# Patient Record
Sex: MALE | Race: BLACK OR AFRICAN AMERICAN | NOT HISPANIC OR LATINO | Employment: FULL TIME | URBAN - METROPOLITAN AREA
[De-identification: names, ages, dates, MRNs, and addresses within clinical notes are randomized per-mention and may not be internally consistent; named-entity substitution may affect disease eponyms.]

---

## 2019-01-01 ENCOUNTER — HOSPITAL ENCOUNTER (EMERGENCY)
Facility: HOSPITAL | Age: 44
Discharge: ANOTHER HOSPITAL | End: 2019-01-01
Attending: EMERGENCY MEDICINE
Payer: MEDICAID

## 2019-01-01 VITALS
TEMPERATURE: 100 F | HEART RATE: 52 BPM | HEIGHT: 71 IN | DIASTOLIC BLOOD PRESSURE: 79 MMHG | OXYGEN SATURATION: 100 % | WEIGHT: 140 LBS | SYSTOLIC BLOOD PRESSURE: 133 MMHG | BODY MASS INDEX: 19.6 KG/M2 | RESPIRATION RATE: 16 BRPM

## 2019-01-01 DIAGNOSIS — R22.0 LEFT FACIAL SWELLING: ICD-10-CM

## 2019-01-01 DIAGNOSIS — K04.7 PERIAPICAL ABSCESS: Primary | ICD-10-CM

## 2019-01-01 LAB
ALBUMIN SERPL BCP-MCNC: 4 G/DL
ALP SERPL-CCNC: 85 U/L
ALT SERPL W/O P-5'-P-CCNC: 6 U/L
ANION GAP SERPL CALC-SCNC: 12 MMOL/L
AST SERPL-CCNC: 15 U/L
BASOPHILS # BLD AUTO: 0.01 K/UL
BASOPHILS NFR BLD: 0.2 %
BILIRUB SERPL-MCNC: 0.9 MG/DL
BNP SERPL-MCNC: 35 PG/ML
BUN SERPL-MCNC: 14 MG/DL
CALCIUM SERPL-MCNC: 9.3 MG/DL
CHLORIDE SERPL-SCNC: 100 MMOL/L
CO2 SERPL-SCNC: 24 MMOL/L
CREAT SERPL-MCNC: 1.2 MG/DL
DIFFERENTIAL METHOD: ABNORMAL
EOSINOPHIL # BLD AUTO: 0 K/UL
EOSINOPHIL NFR BLD: 0.2 %
ERYTHROCYTE [DISTWIDTH] IN BLOOD BY AUTOMATED COUNT: 11.9 %
EST. GFR  (AFRICAN AMERICAN): >60 ML/MIN/1.73 M^2
EST. GFR  (NON AFRICAN AMERICAN): >60 ML/MIN/1.73 M^2
GLUCOSE SERPL-MCNC: 105 MG/DL
HCT VFR BLD AUTO: 42.4 %
HGB BLD-MCNC: 14.2 G/DL
IMM GRANULOCYTES # BLD AUTO: 0.01 K/UL
IMM GRANULOCYTES NFR BLD AUTO: 0.2 %
LACTATE SERPL-SCNC: 1 MMOL/L
LYMPHOCYTES # BLD AUTO: 0.9 K/UL
LYMPHOCYTES NFR BLD: 13.8 %
MCH RBC QN AUTO: 31.7 PG
MCHC RBC AUTO-ENTMCNC: 33.5 G/DL
MCV RBC AUTO: 95 FL
MONOCYTES # BLD AUTO: 0.9 K/UL
MONOCYTES NFR BLD: 14.8 %
NEUTROPHILS # BLD AUTO: 4.5 K/UL
NEUTROPHILS NFR BLD: 70.8 %
NRBC BLD-RTO: 0 /100 WBC
PLATELET # BLD AUTO: 255 K/UL
PMV BLD AUTO: 9.8 FL
POTASSIUM SERPL-SCNC: 4 MMOL/L
PROT SERPL-MCNC: 8.4 G/DL
RBC # BLD AUTO: 4.48 M/UL
SODIUM SERPL-SCNC: 136 MMOL/L
WBC # BLD AUTO: 6.29 K/UL

## 2019-01-01 PROCEDURE — 99285 EMERGENCY DEPT VISIT HI MDM: CPT

## 2019-01-01 PROCEDURE — S0077 INJECTION, CLINDAMYCIN PHOSP: HCPCS | Performed by: PHYSICIAN ASSISTANT

## 2019-01-01 PROCEDURE — 25500020 PHARM REV CODE 255: Performed by: EMERGENCY MEDICINE

## 2019-01-01 PROCEDURE — 85025 COMPLETE CBC W/AUTO DIFF WBC: CPT

## 2019-01-01 PROCEDURE — 25000003 PHARM REV CODE 250: Performed by: PHYSICIAN ASSISTANT

## 2019-01-01 PROCEDURE — 99284 EMERGENCY DEPT VISIT MOD MDM: CPT | Mod: ,,, | Performed by: PHYSICIAN ASSISTANT

## 2019-01-01 PROCEDURE — 99284 PR EMERGENCY DEPT VISIT,LEVEL IV: ICD-10-PCS | Mod: ,,, | Performed by: PHYSICIAN ASSISTANT

## 2019-01-01 PROCEDURE — 96361 HYDRATE IV INFUSION ADD-ON: CPT

## 2019-01-01 PROCEDURE — 87040 BLOOD CULTURE FOR BACTERIA: CPT | Mod: 59

## 2019-01-01 PROCEDURE — 96365 THER/PROPH/DIAG IV INF INIT: CPT

## 2019-01-01 PROCEDURE — 83605 ASSAY OF LACTIC ACID: CPT

## 2019-01-01 PROCEDURE — 80053 COMPREHEN METABOLIC PANEL: CPT

## 2019-01-01 PROCEDURE — 83880 ASSAY OF NATRIURETIC PEPTIDE: CPT

## 2019-01-01 RX ORDER — CLINDAMYCIN PHOSPHATE 900 MG/50ML
900 INJECTION, SOLUTION INTRAVENOUS
Status: COMPLETED | OUTPATIENT
Start: 2019-01-01 | End: 2019-01-01

## 2019-01-01 RX ORDER — ACETAMINOPHEN 325 MG/1
650 TABLET ORAL
Status: COMPLETED | OUTPATIENT
Start: 2019-01-01 | End: 2019-01-01

## 2019-01-01 RX ORDER — LEVOTHYROXINE SODIUM 100 UG/1
100 TABLET ORAL DAILY
COMMUNITY
End: 2023-02-16

## 2019-01-01 RX ORDER — METOPROLOL TARTRATE 50 MG/1
50 TABLET ORAL 2 TIMES DAILY
COMMUNITY

## 2019-01-01 RX ADMIN — SODIUM CHLORIDE 30 ML/KG/HR: 0.9 INJECTION, SOLUTION INTRAVENOUS at 04:01

## 2019-01-01 RX ADMIN — SODIUM CHLORIDE 250 ML: 0.9 INJECTION, SOLUTION INTRAVENOUS at 08:01

## 2019-01-01 RX ADMIN — CLINDAMYCIN IN 5 PERCENT DEXTROSE 900 MG: 18 INJECTION, SOLUTION INTRAVENOUS at 04:01

## 2019-01-01 RX ADMIN — ACETAMINOPHEN 650 MG: 325 TABLET ORAL at 04:01

## 2019-01-01 RX ADMIN — IOHEXOL 75 ML: 350 INJECTION, SOLUTION INTRAVENOUS at 04:01

## 2019-01-01 NOTE — ED NOTES
Patient identifiers verified and correct for Mr Kingston  C/C: swelling to left cheek, min redness or warmth  APPEARANCE: awake and alert in NAD.  SKIN: warm, dry, swelling to left face/cheek to mouth.   MUSCULOSKELETAL: Patient moving all extremities spontaneously, no obvious swelling or deformities noted. Ambulates independently.  RESPIRATORY: Denies shortness of breath.Respirations unlabored. Positive chills  CARDIAC: Denies CP, 2+ distal pulses; no peripheral edema  ABDOMEN: S/ND/NT, Denies nausea  : voids spontaneously, denies difficulty  Neurologic: AAO x 4; follows commands equal strength in all extremities; denies numbness/tingling. Denies dizziness Denies weakness

## 2019-01-01 NOTE — ED PROVIDER NOTES
"Encounter Date: 1/1/2019    SCRIBE #1 NOTE: I, Wm Pineda, am scribing for, and in the presence of,  Dr. Conde . I have scribed the following portions of the note - the APC attestation.       History     Chief Complaint   Patient presents with    Facial Swelling     'something bit me     Patient is a 43 year old male with PMHX of HTN and thyroid disease. He presents to the ED for facial swelling. He reports having left facial swelling onset last night. States "something bite me." patient denies visualizing insects. Rates pain 6/10. He denies fever,chills, nausea, vomiting, sob, chest pain, abd pain, dysuria, diarrhea, or constipation. He is a non smoker and denies alcohol use.          Review of patient's allergies indicates:   Allergen Reactions    Lisinopril Swelling     Facial swelling     Past Medical History:   Diagnosis Date    Hypertension     Thyroid disease      History reviewed. No pertinent surgical history.  History reviewed. No pertinent family history.  Social History     Tobacco Use    Smoking status: Never Smoker    Smokeless tobacco: Never Used   Substance Use Topics    Alcohol use: No     Frequency: Never    Drug use: No     Review of Systems   Constitutional: Negative for fever.   HENT: Positive for facial swelling. Negative for sore throat.    Respiratory: Negative for shortness of breath.    Cardiovascular: Negative for chest pain.   Gastrointestinal: Negative for abdominal pain, nausea and vomiting.   Genitourinary: Negative for dysuria.   Musculoskeletal: Negative for back pain.   Skin: Negative for rash.   Neurological: Negative for weakness.   Hematological: Does not bruise/bleed easily.       Physical Exam     Initial Vitals [01/01/19 1535]   BP Pulse Resp Temp SpO2   (!) 174/89 63 18 (!) 100.6 °F (38.1 °C) 100 %      MAP       --         Physical Exam    Vitals reviewed.  Constitutional: He appears well-developed and well-nourished. No distress.   HENT:   Head: Normocephalic. "   Nose: Right sinus exhibits no maxillary sinus tenderness. Left sinus exhibits maxillary sinus tenderness.   Mouth/Throat: Uvula is midline, oropharynx is clear and moist and mucous membranes are normal. No trismus in the jaw. Abnormal dentition. Dental caries present. No dental abscesses or uvula swelling. No oropharyngeal exudate, posterior oropharyngeal edema or posterior oropharyngeal erythema.   Left facial swelling. No TTP over mandible.    Eyes: Conjunctivae and EOM are normal.   Neck: Normal range of motion.   Cardiovascular: Normal rate and regular rhythm.   No murmur heard.  Pulmonary/Chest: Breath sounds normal. No respiratory distress. He has no wheezes. He has no rales.   Abdominal: Soft. Bowel sounds are normal. He exhibits no distension. There is no tenderness.   Musculoskeletal: Normal range of motion.   Neurological: He is alert and oriented to person, place, and time.   Skin: Skin is warm and dry. No erythema.         ED Course   Procedures  Labs Reviewed   CBC W/ AUTO DIFFERENTIAL - Abnormal; Notable for the following components:       Result Value    RBC 4.48 (*)     MCH 31.7 (*)     Lymph # 0.9 (*)     Lymph% 13.8 (*)     All other components within normal limits   COMPREHENSIVE METABOLIC PANEL - Abnormal; Notable for the following components:    ALT 6 (*)     All other components within normal limits    Narrative:     ADD-ON BNP #[id number] PER LOUANN ROGERS PA-C 16:33    01/01/2019    CULTURE, BLOOD   CULTURE, BLOOD   LACTIC ACID, PLASMA   B-TYPE NATRIURETIC PEPTIDE   B-TYPE NATRIURETIC PEPTIDE    Narrative:     ADD-ON BNP #[id number] PER LOUANN ROGERS PA-C 16:33    01/01/2019           Imaging Results          CT Maxillofacial With Contrast (Final result)     Abnormal  Result time 01/01/19 18:31:51    Final result by Shahram Rosales MD (01/01/19 18:31:51)                 Impression:      3.1 cm periapical abscess involving the left maxillary cuspid tooth, as above.  Recommend  consultation with ENT for further evaluation.    Chronic sinusitis involving the left frontal, maxillary, and ethmoid sinuses as detailed above.    Prominent cerumen impaction.    This report was flagged in Epic as abnormal.    Electronically signed by resident: Claude Ceballos  Date:    01/01/2019  Time:    17:38    Electronically signed by: Shahram Rosales MD  Date:    01/01/2019  Time:    18:31             Narrative:    EXAMINATION:  CT MAXILLOFACIAL WITH CONTRAST    CLINICAL HISTORY:  Fever of unknown origin;    TECHNIQUE:  Low dose axial images, sagittal and coronal reformations were obtained through the face.  Contrast was not administered.    COMPARISON:  None.    FINDINGS:  Facial bones: There is abnormal osseous erosion of the left maxillary bone surrounding the left maxillary cuspid tooth.  Additionally, there is an associated fluid collection measuring approximately 3.1 x 1.7 cm and extending into the pre maxillary soft tissues as well as the intraoral soft tissues of the hard palate consistent with periapical abscess and dehiscence.  There is additional lucency and maxillary osseous erosion surrounding the right 1st incisor.  Scattered air foci overlie this region without discrete fluid collection.    Subcutaneous soft tissues: There is subcutaneous soft tissue thickening extending along the malar region superior to the aforementioned abscess consistent with inflammatory change.    Orbits: Normal, specifically without evidence for periorbital cellulitis.    Paranasal sinuses: There is complete opacification of the left frontal sinus and left maxillary sinus and sclerosis of the sinus wall most consistent with chronic sinusitis.  Additionally, there is erosion of the medial involvement of the superior and middle turbinates.  Ethmoid sinuses demonstrate patchy opacification.  Mastoid air cells are essentially clear.    Aerodigestive tract: Normal.    Lymph nodes: There are multiple enlarged left cervical nodes  suggesting reactive lymphadenopathy.  Largest node measures approximately 1.1 cm in the left submandibular region.    Salivary glands: Bilateral parotid and submandibular glands are normal.    Vascular structures: Normal, noting a diminutive left distal vertebral artery.    Skull base: Normal.    Cervical spine (imaged portions): Normal.    Other findings: There is abundant soft tissue attenuation material filling both external auditory canals consistent with cerumen impaction.                                 Medical Decision Making:   History:   Old Medical Records: I decided to obtain old medical records.  Clinical Tests:   Lab Tests: Ordered and Reviewed  Radiological Study: Ordered and Reviewed       APC / Resident Notes:   Patient is a 43 year old male presents to the ED for emergent evaluation of facial swelling.     Will order labs and imaging. Will order IVF, clindamycin 900 mg, and tylenol. Will continue to monitor.     Differential diagnoses include, but are not limited to: cellulitis, abscess, acute gingivitis, or electrolyte imbalance.     No leukocytosis. Hemodynamically stable. Lactate WNL. Blood cultures pending. CT maxillofacial found to have 3.1 cm periapical abscess involving the left maxillary cuspid tooth. Case discussed with ENT whom recommend transfer to [facility] for OMFS.     Will transfer to [facility] for further management. Patient accepted by Dr. Dutton at [facility].     I have discussed and reviewed with my supervising physician.       Scribe Attestation:   Scribe #1: I performed the above scribed service and the documentation accurately describes the services I performed. I attest to the accuracy of the note.    Attending Attestation:     Physician Attestation Statement for NP/PA:   I discussed this assessment and plan of this patient with the NP/PA, but I did not personally examine the patient. The face to face encounter was performed by the NP/PA.    Other NP/PA Attestation Additions:    History of  Present Illness: 43 year old male with co-morbidities including HTN and thyroid disorder, presents with left sided facial swelling since yesterday.              Clinical Impression:   The primary encounter diagnosis was Periapical abscess. A diagnosis of Left facial swelling was also pertinent to this visit.      Disposition:   Disposition: Transferred  Condition: Stable                        Belia Fishman PA-C  01/01/19 1942

## 2019-01-02 NOTE — ED NOTES
Patient transported to [facility] with [facility] via stretcher, with personal belongings

## 2019-01-02 NOTE — ED NOTES
Called [place] ambulance service and they should be picking him up about 2100. Pt given the info and IV fluids infusing--pt has a work excuse till 5 jan and will be treated at [facility] for a dental abscess.

## 2019-01-06 LAB
BACTERIA BLD CULT: NORMAL
BACTERIA BLD CULT: NORMAL

## 2021-12-15 ENCOUNTER — HOSPITAL ENCOUNTER (EMERGENCY)
Facility: HOSPITAL | Age: 46
Discharge: HOME | End: 2021-12-15
Attending: EMERGENCY MEDICINE
Payer: MEDICAID

## 2021-12-15 VITALS
TEMPERATURE: 98 F | DIASTOLIC BLOOD PRESSURE: 88 MMHG | RESPIRATION RATE: 14 BRPM | HEART RATE: 91 BPM | SYSTOLIC BLOOD PRESSURE: 137 MMHG | WEIGHT: 140 LBS | BODY MASS INDEX: 19.6 KG/M2 | HEIGHT: 71 IN

## 2021-12-15 DIAGNOSIS — Z20.822 LAB TEST NEGATIVE FOR COVID-19 VIRUS: ICD-10-CM

## 2021-12-15 DIAGNOSIS — Z20.822 ENCOUNTER FOR LABORATORY TESTING FOR COVID-19 VIRUS: Primary | ICD-10-CM

## 2021-12-15 LAB
CTP QC/QA: YES
SARS-COV-2 RDRP RESP QL NAA+PROBE: NEGATIVE

## 2021-12-15 PROCEDURE — U0002 COVID-19 LAB TEST NON-CDC: HCPCS | Performed by: EMERGENCY MEDICINE

## 2021-12-15 PROCEDURE — 99282 EMERGENCY DEPT VISIT SF MDM: CPT | Mod: 25

## 2021-12-15 PROCEDURE — 99281 EMR DPT VST MAYX REQ PHY/QHP: CPT | Mod: CS,,, | Performed by: PHYSICIAN ASSISTANT

## 2021-12-15 PROCEDURE — 99281 PR EMERGENCY DEPT VISIT,LEVEL I: ICD-10-PCS | Mod: CS,,, | Performed by: PHYSICIAN ASSISTANT

## 2021-12-20 ENCOUNTER — HOSPITAL ENCOUNTER (EMERGENCY)
Facility: HOSPITAL | Age: 46
Discharge: HOME | End: 2021-12-20
Attending: EMERGENCY MEDICINE
Payer: MEDICAID

## 2021-12-20 VITALS
DIASTOLIC BLOOD PRESSURE: 82 MMHG | SYSTOLIC BLOOD PRESSURE: 123 MMHG | BODY MASS INDEX: 19.6 KG/M2 | HEIGHT: 71 IN | WEIGHT: 140 LBS | HEART RATE: 92 BPM | TEMPERATURE: 99 F | OXYGEN SATURATION: 97 % | RESPIRATION RATE: 18 BRPM

## 2021-12-20 DIAGNOSIS — Z20.822 COVID-19 VIRUS NOT DETECTED: Primary | ICD-10-CM

## 2021-12-20 LAB
CTP QC/QA: YES
SARS-COV-2 RDRP RESP QL NAA+PROBE: NEGATIVE

## 2021-12-20 PROCEDURE — 99282 EMERGENCY DEPT VISIT SF MDM: CPT | Mod: 25

## 2021-12-20 PROCEDURE — 99281 PR EMERGENCY DEPT VISIT,LEVEL I: ICD-10-PCS | Mod: CS,,, | Performed by: EMERGENCY MEDICINE

## 2021-12-20 PROCEDURE — 99281 EMR DPT VST MAYX REQ PHY/QHP: CPT | Mod: CS,,, | Performed by: EMERGENCY MEDICINE

## 2021-12-20 PROCEDURE — U0002 COVID-19 LAB TEST NON-CDC: HCPCS | Performed by: EMERGENCY MEDICINE

## 2023-02-16 ENCOUNTER — HOSPITAL ENCOUNTER (EMERGENCY)
Facility: HOSPITAL | Age: 48
Discharge: HOME | End: 2023-02-16
Attending: EMERGENCY MEDICINE
Payer: MEDICAID

## 2023-02-16 VITALS
OXYGEN SATURATION: 99 % | SYSTOLIC BLOOD PRESSURE: 137 MMHG | HEART RATE: 62 BPM | RESPIRATION RATE: 16 BRPM | DIASTOLIC BLOOD PRESSURE: 75 MMHG | TEMPERATURE: 98 F

## 2023-02-16 DIAGNOSIS — Z76.0 ENCOUNTER FOR MEDICATION REFILL: Primary | ICD-10-CM

## 2023-02-16 PROCEDURE — 99283 EMERGENCY DEPT VISIT LOW MDM: CPT

## 2023-02-16 PROCEDURE — 99283 EMERGENCY DEPT VISIT LOW MDM: CPT | Mod: ,,, | Performed by: EMERGENCY MEDICINE

## 2023-02-16 PROCEDURE — 99283 PR EMERGENCY DEPT VISIT,LEVEL III: ICD-10-PCS | Mod: ,,, | Performed by: EMERGENCY MEDICINE

## 2023-02-16 RX ORDER — LEVOTHYROXINE SODIUM 75 UG/1
75 TABLET ORAL
Qty: 30 TABLET | Refills: 0 | Status: SHIPPED | OUTPATIENT
Start: 2023-02-16

## 2023-02-17 NOTE — ED PROVIDER NOTES
"History:  Kirk Kingston is a 47 y.o. male who presents to the ED with Medication Refill (States has been out of thyroid medication x6 days. Today started feeling "sluggish".)    Described as 47-year-old male with a history of hypothyroidism presenting to the emergency department requesting refill of his Synthroid.  He reports he ran out 6 days ago and just does not feel himself.  He reports he has been eating and drinking well, no issues with confusion or sleepiness.    Review of Systems: All systems reviewed and are negative except as per history of present illness.    Medications:   Discharge Medication List as of 2023  3:59 AM        CONTINUE these medications which have NOT CHANGED    Details   metoprolol tartrate (LOPRESSOR) 50 MG tablet Take 50 mg by mouth 2 (two) times daily., Historical Med             PMH:   Past Medical History:   Diagnosis Date    Hypertension     Thyroid disease      PSH: History reviewed. No pertinent surgical history.  Allergies: He is allergic to lisinopril.  Social History: Marital Status: single. He  reports that he has never smoked. He has never used smokeless tobacco.. He  reports no history of alcohol use..       Exam:  VITAL SIGNS:   Vitals:    23 0305   BP: 137/75   BP Location: Right arm   Patient Position: Sitting   Pulse: 62   Resp: 16   Temp: 97.5 °F (36.4 °C)   TempSrc: Oral   SpO2: 99%     Const: Awake and alert, NAD   Head: Atraumatic  Eyes: Normal Conjunctiva  ENT: Normal External Ears, Nose and Mouth.  Neck: Full range of motion. No meningismus.  Resp: Normal respiratory effort, No distress  Cardio: Equal and intact distal pulses  Skin: No petechiae or rashes  Ext: No cyanosis, or edema  Neur: Awake and alert  Psych: Normal Mood and Affect    Medical Decision Makin-year-old male presenting to the emergency department for a medication refill.  His pharmacy was called for current dosage since our system did not have new dose since 2019.  This was " refilled and he was encouraged to follow up with his primary physician for further refills in the future and for further testing of his thyroid level and adjustments to medications as needed.    Clinical Impression:  1. Encounter for medication refill             Medication List        CHANGE how you take these medications      levothyroxine 75 MCG tablet  Commonly known as: SYNTHROID  Take 1 tablet (75 mcg total) by mouth before breakfast.  What changed:   medication strength  how much to take  when to take this            ASK your doctor about these medications      metoprolol tartrate 50 MG tablet  Commonly known as: LOPRESSOR               Where to Get Your Medications        These medications were sent to [id number] - [place] [id number] AT [facility] [address] [place] [id number] [facility] [address]      Phone: [phone]   levothyroxine 75 MCG tablet         Follow-up Information       Your primary doctor. Schedule an appointment as soon as possible for a visit in 2 days.    Why: Have your thyroid levels checked as needed.                             Adrianne Payton MD  02/16/23 7319

## 2023-03-10 ENCOUNTER — HOSPITAL ENCOUNTER (EMERGENCY)
Facility: HOSPITAL | Age: 48
Discharge: HOME | End: 2023-03-10
Attending: EMERGENCY MEDICINE
Payer: MEDICAID

## 2023-03-10 VITALS
SYSTOLIC BLOOD PRESSURE: 159 MMHG | HEIGHT: 71 IN | DIASTOLIC BLOOD PRESSURE: 92 MMHG | HEART RATE: 71 BPM | RESPIRATION RATE: 18 BRPM | WEIGHT: 140 LBS | TEMPERATURE: 99 F | OXYGEN SATURATION: 99 % | BODY MASS INDEX: 19.6 KG/M2

## 2023-03-10 DIAGNOSIS — K04.7 DENTAL ABSCESS: Primary | ICD-10-CM

## 2023-03-10 PROCEDURE — 99284 EMERGENCY DEPT VISIT MOD MDM: CPT | Mod: 25,,, | Performed by: EMERGENCY MEDICINE

## 2023-03-10 PROCEDURE — 99283 EMERGENCY DEPT VISIT LOW MDM: CPT

## 2023-03-10 PROCEDURE — 41800 PR DRAINAGE OF GUM LESION: ICD-10-PCS | Mod: ,,, | Performed by: EMERGENCY MEDICINE

## 2023-03-10 PROCEDURE — 41800 DRAINAGE OF GUM LESION: CPT

## 2023-03-10 PROCEDURE — 99284 PR EMERGENCY DEPT VISIT,LEVEL IV: ICD-10-PCS | Mod: 25,,, | Performed by: EMERGENCY MEDICINE

## 2023-03-10 PROCEDURE — 41800 DRAINAGE OF GUM LESION: CPT | Mod: ,,, | Performed by: EMERGENCY MEDICINE

## 2023-03-10 PROCEDURE — 25000003 PHARM REV CODE 250

## 2023-03-10 RX ORDER — AMOXICILLIN AND CLAVULANATE POTASSIUM 875; 125 MG/1; MG/1
1 TABLET, FILM COATED ORAL 2 TIMES DAILY
Qty: 14 TABLET | Refills: 0 | Status: SHIPPED | OUTPATIENT
Start: 2023-03-10

## 2023-03-10 RX ORDER — HYDROCODONE BITARTRATE AND ACETAMINOPHEN 5; 325 MG/1; MG/1
1 TABLET ORAL
Status: COMPLETED | OUTPATIENT
Start: 2023-03-10 | End: 2023-03-10

## 2023-03-10 RX ADMIN — HYDROCODONE BITARTRATE AND ACETAMINOPHEN 1 TABLET: 5; 325 TABLET ORAL at 01:03

## 2023-03-10 NOTE — ED PROVIDER NOTES
Encounter Date: 3/10/2023       History     Chief Complaint   Patient presents with    Abscess     Pt reports right-side facial pain; swelling noted to face. Hx mouth abscess.     47-year-old male PMH of hypertension and thyroid disease presents to the emergency department for concern of dental abscess.  Patient reports experiencing tenderness and swelling along the right jaw that has been present for 4 days.  However patient states last night it became painful and difficult for him to sleep.  He reports taking OTC medications with minimal relief.  He is still able to talk and to food without difficulty. Patient reports he currently does not have a dentist but he is seeking a dentist due to abnormal dentition.  He denies fever, nausea, vomiting or weakness    Review of patient's allergies indicates:   Allergen Reactions    Lisinopril Swelling     Facial swelling     Past Medical History:   Diagnosis Date    Hypertension     Thyroid disease      History reviewed. No pertinent surgical history.  History reviewed. No pertinent family history.  Social History     Tobacco Use    Smoking status: Never    Smokeless tobacco: Never   Substance Use Topics    Alcohol use: No    Drug use: No     Review of Systems   Constitutional:  Negative for fever.   HENT:  Positive for dental problem. Negative for sore throat and trouble swallowing.         No trismus   Respiratory:  Negative for shortness of breath.    Cardiovascular:  Negative for chest pain.   Gastrointestinal:  Negative for nausea.   Genitourinary:  Negative for dysuria.   Musculoskeletal:  Negative for back pain.   Skin:  Negative for rash.   Neurological:  Negative for weakness.   Hematological:  Does not bruise/bleed easily.     Physical Exam     Initial Vitals [03/10/23 1130]   BP Pulse Resp Temp SpO2   (!) 162/101 78 18 98.9 °F (37.2 °C) 99 %      MAP       --         Physical Exam    Vitals reviewed.  Constitutional: He appears well-developed and well-nourished.    HENT:   Head: Normocephalic and atraumatic.   Mouth/Throat: No oropharyngeal exudate.   Patient has ROM of mandibular bone without difficulty  Along the right  mandibular bone there is an area of tenderness, with swelling  On oral exam abnormal dentition is observed with there is a lateral dental abscess posteriorly of the jaw.  The wound appears to be draining on exam   Eyes: Conjunctivae and EOM are normal.   Cardiovascular:  Normal rate.           Pulmonary/Chest: No respiratory distress.   Abdominal: He exhibits no distension.   Musculoskeletal:         General: Normal range of motion.     Neurological: He is alert and oriented to person, place, and time.   Skin: Skin is warm and dry.   Psychiatric: He has a normal mood and affect. Thought content normal.       ED Course   I & D - Incision and Drainage    Date/Time: 3/10/2023 7:39 PM  Location procedure was performed: [facility] EMERGENCY DEPARTMENT  Performed by: Roseann Yuen PA-C  Authorized by: Katia iNcolas MD   Type: abscess  Body area: mouth  Location details: alveolar process  Scalpel size: 11  Incision type: single straight  Complexity: simple  Drainage: pus and bloody  Drainage amount: scant  Wound treatment: wound left open  Complications: No  Specimens: No  Implants: No      Labs Reviewed - No data to display       Imaging Results    None          Medications   HYDROcodone-acetaminophen 5-325 mg per tablet 1 tablet (1 tablet Oral Given 3/10/23 1346)     Medical Decision Making:   Initial Assessment:   47-year-old male presents emergency department for concern of dental abscess  Differential Diagnosis:   Differential diagnosis include:  Cellulitis, dental abscess, abnormal dentition  ED Management:  Based on physical exam edema is limited to dental infection  Patient does not present with any systemic symptoms  I&D was performed, expelled pus with blood.  Patient tolerated procedure well  After procedure swelling was notably decreased.  He was  given outpatient Rx of antibiotics  Also the patient follow up with dental clinic.  Return precautions were discussed  Patient discharged home discussed with supervising physician          Attending Attestation:     Physician Attestation Statement for NP/PA:   I have conducted a face to face encounter with this patient in addition to the NP/PA, due to Medical Complexity    Other NP/PA Attestation Additions:     Physical Exam: Pt has TTP and fluctuant periapical abscess adjacent to right lower molar, which is actively draining. Associated facial cellulitis, but no orbital involvement with normal EOMI and no sublingual induration concerning for Julius's.    Medical Decision Making: PA to drain abscess, Rx abx. I discussed outpatient follow up and return precautions with pt and answered all questions.       I discussed the patient's care with Advanced Practice Clinician, and did see this patient with the APC.  I reviewed their note and agree with the history, physical, assessment, diagnosis, treatment, and disposition plan provided by the Advanced Practice Clinician.                             Clinical Impression:   Final diagnoses:  [K04.7] Dental abscess (Primary)        ED Disposition Condition    Discharge Stable          ED Prescriptions       Medication Sig Dispense Start Date End Date Auth. Provider    amoxicillin-clavulanate 875-125mg (AUGMENTIN) 875-125 mg per tablet Take 1 tablet by mouth 2 (two) times daily. 14 tablet 3/10/2023 -- Roseann Yuen PA-C          Follow-up Information       Follow up With Specialties Details Why Contact [facility] Dental General Practice, Oral and Maxillofacial Surgery, Oral Surgery Schedule an appointment as soon as possible for a visit in 2 days Dental apoointment [address] [facility] [address]  [phone]               Roseann Yuen PA-C  03/10/23 1941       Katia Nicolas MD  03/10/23 2046

## 2023-03-10 NOTE — DISCHARGE INSTRUCTIONS
As discussed follow up with your dentist for evaluation of dentition  Today we expelled pus from your dental abscess  Take antibiotics as directed, eat a soft diet to avoid further injury to tooth.     Take Tylenol for pain    If you develop fever, nausea, vomiting weakness or worsening swelling you can represents to the emergency department

## 2023-03-10 NOTE — ED NOTES
Patient states swelling and pain to right lower jaw, states when he turned the AC on his pain was worse, no OTC meds

## 2023-03-10 NOTE — ED NOTES
Patient identifiers verified and correct for  Mr Kingston  C/C:  Swelling to right lower jaw SEE NN  APPEARANCE: awake and alert in NAD. PAIN 10/10  SKIN: warm, dry and intact. No breakdown or bruising.  MUSCULOSKELETAL: Patient moving all extremities spontaneously, swelling to right lower face/jaw Ambulates independently.  RESPIRATORY: Denies shortness of breath.Respirations unlabored.   CARDIAC: Denies CP, 2+ distal pulses; no peripheral edema  ABDOMEN: S/ND/NT, Denies nausea  : voids spontaneously, denies difficulty  Neurologic: AAO x 4; follows commands equal strength in all extremities; denies numbness/tingling. Denies dizziness  Denies new wekaness,

## 2024-02-23 PROCEDURE — 80048 BASIC METABOLIC PNL TOTAL CA: CPT

## 2024-02-24 ENCOUNTER — HOSPITAL ENCOUNTER (EMERGENCY)
Facility: HOSPITAL | Age: 49
Discharge: HOME | End: 2024-02-24
Attending: EMERGENCY MEDICINE

## 2024-02-24 VITALS
OXYGEN SATURATION: 99 % | SYSTOLIC BLOOD PRESSURE: 138 MMHG | RESPIRATION RATE: 18 BRPM | TEMPERATURE: 98 F | DIASTOLIC BLOOD PRESSURE: 94 MMHG | HEART RATE: 75 BPM

## 2024-02-24 DIAGNOSIS — K52.9 GASTROENTERITIS: Primary | ICD-10-CM

## 2024-02-24 PROBLEM — F10.20 ALCOHOL USE DISORDER, MODERATE, DEPENDENCE: Status: ACTIVE | Noted: 2023-09-21

## 2024-02-24 PROBLEM — F17.200 TOBACCO DEPENDENCE SYNDROME: Status: ACTIVE | Noted: 2023-09-21

## 2024-02-24 LAB
ALBUMIN SERPL BCP-MCNC: 3.8 G/DL (ref 3.5–5.2)
ALP SERPL-CCNC: 59 U/L (ref 55–135)
ALT SERPL W/O P-5'-P-CCNC: 11 U/L (ref 10–44)
ANION GAP SERPL CALC-SCNC: 11 MMOL/L (ref 8–16)
AST SERPL-CCNC: 18 U/L (ref 10–40)
BASOPHILS # BLD AUTO: 0.02 K/UL (ref 0–0.2)
BASOPHILS NFR BLD: 0.4 % (ref 0–1.9)
BILIRUB SERPL-MCNC: 0.2 MG/DL (ref 0.1–1)
BUN SERPL-MCNC: 12 MG/DL (ref 6–20)
BUN SERPL-MCNC: 12 MG/DL (ref 6–30)
CALCIUM SERPL-MCNC: 9.6 MG/DL (ref 8.7–10.5)
CHLORIDE SERPL-SCNC: 101 MMOL/L (ref 95–110)
CHLORIDE SERPL-SCNC: 104 MMOL/L (ref 95–110)
CO2 SERPL-SCNC: 25 MMOL/L (ref 23–29)
CREAT SERPL-MCNC: 1.1 MG/DL (ref 0.5–1.4)
CREAT SERPL-MCNC: 1.1 MG/DL (ref 0.5–1.4)
DIFFERENTIAL METHOD BLD: ABNORMAL
EOSINOPHIL # BLD AUTO: 0 K/UL (ref 0–0.5)
EOSINOPHIL NFR BLD: 0.8 % (ref 0–8)
ERYTHROCYTE [DISTWIDTH] IN BLOOD BY AUTOMATED COUNT: 12.7 % (ref 11.5–14.5)
EST. GFR  (NO RACE VARIABLE): >60 ML/MIN/1.73 M^2
GLUCOSE SERPL-MCNC: 94 MG/DL (ref 70–110)
GLUCOSE SERPL-MCNC: 94 MG/DL (ref 70–110)
HCT VFR BLD AUTO: 40.4 % (ref 40–54)
HCT VFR BLD CALC: 40 %PCV (ref 36–54)
HCV AB SERPL QL IA: NORMAL
HGB BLD-MCNC: 13.5 G/DL (ref 14–18)
HIV 1+2 AB+HIV1 P24 AG SERPL QL IA: NORMAL
IMM GRANULOCYTES # BLD AUTO: 0.02 K/UL (ref 0–0.04)
IMM GRANULOCYTES NFR BLD AUTO: 0.4 % (ref 0–0.5)
LIPASE SERPL-CCNC: 38 U/L (ref 4–60)
LYMPHOCYTES # BLD AUTO: 1.4 K/UL (ref 1–4.8)
LYMPHOCYTES NFR BLD: 26.2 % (ref 18–48)
MCH RBC QN AUTO: 31.7 PG (ref 27–31)
MCHC RBC AUTO-ENTMCNC: 33.4 G/DL (ref 32–36)
MCV RBC AUTO: 95 FL (ref 82–98)
MONOCYTES # BLD AUTO: 0.7 K/UL (ref 0.3–1)
MONOCYTES NFR BLD: 12.4 % (ref 4–15)
NEUTROPHILS # BLD AUTO: 3.2 K/UL (ref 1.8–7.7)
NEUTROPHILS NFR BLD: 59.8 % (ref 38–73)
NRBC BLD-RTO: 0 /100 WBC
PLATELET # BLD AUTO: 264 K/UL (ref 150–450)
PMV BLD AUTO: 10.1 FL (ref 9.2–12.9)
POC IONIZED CALCIUM: 1.22 MMOL/L (ref 1.06–1.42)
POC TCO2 (MEASURED): 33 MMOL/L (ref 23–29)
POTASSIUM BLD-SCNC: 4.1 MMOL/L (ref 3.5–5.1)
POTASSIUM SERPL-SCNC: 4.4 MMOL/L (ref 3.5–5.1)
PROT SERPL-MCNC: 7.2 G/DL (ref 6–8.4)
RBC # BLD AUTO: 4.26 M/UL (ref 4.6–6.2)
SAMPLE: ABNORMAL
SODIUM BLD-SCNC: 140 MMOL/L (ref 136–145)
SODIUM SERPL-SCNC: 140 MMOL/L (ref 136–145)
WBC # BLD AUTO: 5.31 K/UL (ref 3.9–12.7)

## 2024-02-24 PROCEDURE — 83690 ASSAY OF LIPASE: CPT | Performed by: EMERGENCY MEDICINE

## 2024-02-24 PROCEDURE — 86803 HEPATITIS C AB TEST: CPT | Performed by: PHYSICIAN ASSISTANT

## 2024-02-24 PROCEDURE — 80053 COMPREHEN METABOLIC PANEL: CPT | Performed by: EMERGENCY MEDICINE

## 2024-02-24 PROCEDURE — 85025 COMPLETE CBC W/AUTO DIFF WBC: CPT | Performed by: EMERGENCY MEDICINE

## 2024-02-24 PROCEDURE — 99283 EMERGENCY DEPT VISIT LOW MDM: CPT

## 2024-02-24 PROCEDURE — 87389 HIV-1 AG W/HIV-1&-2 AB AG IA: CPT | Performed by: PHYSICIAN ASSISTANT

## 2024-02-24 NOTE — DISCHARGE INSTRUCTIONS
As discussed, your symptoms likely represent a viral illness. Unfortunately there are no specific medicines to make the illness end faster. Antibiotics do not work for viral illnesses because they do not kill viruses.    Tests today showed:   Labs Reviewed   CBC W/ AUTO DIFFERENTIAL - Abnormal; Notable for the following components:       Result Value    RBC 4.26 (*)     Hemoglobin 13.5 (*)     MCH 31.7 (*)     All other components within normal limits    Narrative:     Release to patient->Immediate   ISTAT PROCEDURE - Abnormal; Notable for the following components:    POC TCO2 (MEASURED) 33 (*)     All other components within normal limits   COMPREHENSIVE METABOLIC PANEL    Narrative:     Release to patient->Immediate   LIPASE    Narrative:     Release to patient->Immediate   HIV 1 / 2 ANTIBODY   HEPATITIS C ANTIBODY   URINALYSIS, REFLEX TO URINE CULTURE   ISTAT CHEM8     Imaging Results    None         Treatments you had today:   Medications - No data to display    Home Care Instructions:  - Take acetaminophen (Tylenol) or ibuprofen (Advil, Motrin) for with fevers, aches and pains   - Stay well hydrated and well rested  - Continue taking your home medications as prescribed    Follow-up plan:  - Follow-up with: Primary care doctor within 3 - 5 days  - Follow-up for additional testing and/or evaluation as directed by your primary doctor    Return to the Emergency Department for symptoms including but not limited to: worsening symptoms, severe headache, shortness of breath or chest pain, worsening cough with thick yellow/green sputum, vomiting with inability to hold down fluids, fevers greater than 100.4°F, dizziness, passing out/fainting/unconsciousness, rash, symptoms persisting beyond 7 days, or other concerning symptoms.

## 2024-02-24 NOTE — ED TRIAGE NOTES
Chief Complaint   Patient presents with    Diarrhea     Diarrhea  today, nausea, 1x vomiting.  No abd pain. No blood in stool. No urinary symptoms      APPEARANCE: No acute distress.    NEURO: Awake, alert, appropriate for age  HEENT: Head symmetrical. No obvious deformity  RESPIRATORY: Airway is open and patent. Respirations are spontaneous on room air.   NEUROVASCULAR: All extremities are warm and pink with capillary refill less than 3 seconds.   MUSCULOSKELETAL: Moves all extremities, wiggling toes and moving hands.   SKIN: Warm and dry, adequate turgor, mucus membranes moist and pink    Will continue to monitor.

## 2024-02-24 NOTE — ED PROVIDER NOTES
Encounter Date: 2/23/2024       History     Chief Complaint   Patient presents with    Diarrhea     Diarrhea  today, nausea, 1x vomiting.  No abd pain. No blood in stool. No urinary symptoms      48-year-old male with a past medical history of cirrhosis, hypertension and thyroid disease presents with a chief complaint of diarrhea.  The patient says that he had diarrhea nausea for the past couple of days with subjective fever.  He says that he thinks that he had the flu, but then he developed generalized weakness today and that is why he came into the emergency department to be evaluated.  He says that he feels much better right now.  He was denying any current chest pain, shortness of breath, cough, sore throat, abdominal pain, nausea, vomiting dysuria or new rashes.    The history is provided by the patient. No  was used.     Review of patient's allergies indicates:   Allergen Reactions    Lisinopril Swelling     Facial swelling     Past Medical History:   Diagnosis Date    Hypertension     Thyroid disease      History reviewed. No pertinent surgical history.  History reviewed. No pertinent family history.  Social History     Tobacco Use    Smoking status: Never    Smokeless tobacco: Never   Substance Use Topics    Alcohol use: No    Drug use: No     Review of Systems    Physical Exam     Initial Vitals [02/23/24 2325]   BP Pulse Resp Temp SpO2   136/79 80 18 97.8 °F (36.6 °C) 96 %      MAP       --         Physical Exam    Nursing note and vitals reviewed.  Constitutional: He appears well-developed and well-nourished. He is not diaphoretic. No distress.   HENT:   Head: Normocephalic and atraumatic.   Eyes: EOM are normal.   Neck: Neck supple.   Cardiovascular:  Normal rate, regular rhythm, normal heart sounds and intact distal pulses.           Pulmonary/Chest: Breath sounds normal. He has no wheezes. He has no rhonchi. He has no rales.   Abdominal: Abdomen is soft. There is no abdominal  tenderness. There is no rebound and no guarding.   Musculoskeletal:         General: No tenderness or edema. Normal range of motion.      Cervical back: Neck supple.     Neurological: He is alert and oriented to person, place, and time. He has normal strength.   Skin: Skin is warm and dry. Capillary refill takes less than 2 seconds. No rash noted. No erythema. No pallor.   Psychiatric: He has a normal mood and affect. His behavior is normal. Judgment and thought content normal.         ED Course   Procedures  Labs Reviewed   CBC W/ AUTO DIFFERENTIAL - Abnormal; Notable for the following components:       Result Value    RBC 4.26 (*)     Hemoglobin 13.5 (*)     MCH 31.7 (*)     All other components within normal limits    Narrative:     Release to patient->Immediate   ISTAT PROCEDURE - Abnormal; Notable for the following components:    POC TCO2 (MEASURED) 33 (*)     All other components within normal limits   HIV 1 / 2 ANTIBODY    Narrative:     Release to patient->Immediate   HEPATITIS C ANTIBODY    Narrative:     Release to patient->Immediate   COMPREHENSIVE METABOLIC PANEL    Narrative:     Release to patient->Immediate   LIPASE    Narrative:     Release to patient->Immediate          Imaging Results    None          Medications - No data to display  Medical Decision Making  48-year-old male in no acute distress.  He is denying any acute pain.  Differential includes URI versus gallbladder pathology versus gastroenteritis versus colitis.  The patient's laboratory evaluation was unremarkable as well as his physical exam.  The patient had no abdominal tenderness and says that he feels well.  I offered him viral testing is I think this is the likely cause of the patient's symptoms, however I explained that there be no specific treatment given resolution of his symptoms, and he declined testing.  I discussed return precautions with him and answered all questions.  I provided him with discharge paperwork and he was  discharged in stable condition.    Amount and/or Complexity of Data Reviewed  Labs: ordered.                                      Clinical Impression:  Final diagnoses:  [K52.9] Gastroenteritis (Primary)          ED Disposition Condition    Discharge Stable          ED Prescriptions    None       Follow-up Information       Follow up With Specialties Details Why Contact Info    [facility] Emergency Medicine  As needed, If symptoms worsen [id number]  [facility] [id number]  [phone]             Pierce Lee MD  Resident  02/24/24 2996